# Patient Record
Sex: MALE | Race: WHITE | URBAN - METROPOLITAN AREA
[De-identification: names, ages, dates, MRNs, and addresses within clinical notes are randomized per-mention and may not be internally consistent; named-entity substitution may affect disease eponyms.]

---
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
## 2018-05-22 ENCOUNTER — OFFICE VISIT - HEALTHEAST (OUTPATIENT)
Dept: PHYSICAL MEDICINE AND REHAB | Facility: REHABILITATION | Age: 18
End: 2018-05-22

## 2018-05-22 DIAGNOSIS — M48.02 FORAMINAL STENOSIS OF CERVICAL REGION: ICD-10-CM

## 2018-05-22 DIAGNOSIS — M99.02 SOMATIC DYSFUNCTION OF THORACIC REGION: ICD-10-CM

## 2018-05-22 DIAGNOSIS — M99.07 SOMATIC DYSFUNCTION OF UPPER EXTREMITY: ICD-10-CM

## 2018-05-22 DIAGNOSIS — M99.01 SOMATIC DYSFUNCTION OF CERVICAL REGION: ICD-10-CM

## 2018-05-22 DIAGNOSIS — M99.00 SOMATIC DYSFUNCTION OF HEAD REGION: ICD-10-CM

## 2018-05-22 DIAGNOSIS — M54.2 CERVICALGIA: ICD-10-CM

## 2018-05-22 DIAGNOSIS — M99.08 SOMATIC DYSFUNCTION OF RIB REGION: ICD-10-CM

## 2018-05-22 DIAGNOSIS — M79.18 MYOFASCIAL PAIN: ICD-10-CM

## 2018-05-22 ASSESSMENT — MIFFLIN-ST. JEOR: SCORE: 1897.57

## 2018-05-30 ENCOUNTER — RECORDS - HEALTHEAST (OUTPATIENT)
Dept: ADMINISTRATIVE | Facility: OTHER | Age: 18
End: 2018-05-30

## 2019-05-23 NOTE — LETTER
4/26/2017      RE: Joshua Kenneth Goldberg  [id number] [address] [place] [id number]       CHIEF CONCERN: Concern for left shoulder posterior labral tear  DATE OF INJURY: 2/26/16    HISTORY OF PRESENT ILLNESS: Christopher comes back today with his father for a re-injury to the left shoulder. He was injured in a wrestling match at the beginning of March. At this point he says he feels that the shoulder isn't tight. He feels weak when doing bench press. He can still do lots of pushups.     PHYSICAL EXAM:  Young adult male in no distress. Accompanied by his father  LUE: skin intact, no pain to palpation, full active ROM. Negative apprehension test. Mildly positive Jerk test. Mildly positive Yakov's. No sense of instability. FE and ER strength are 5/5 and symmetric.     ASSESSMENT:  1. Poss left posterior labral tear on prior MRI    PLAN:  - Discussed that given current symptoms and reinjury will obtain MRI  - Will call with results. Discussed if MRI negative would recommend cuff and periscapular strengthening PT program. If significant labral tear noted will discuss next steps.    Umu Caraballo MD       None

## 2019-10-02 ENCOUNTER — HEALTH MAINTENANCE LETTER (OUTPATIENT)
Age: 19
End: 2019-10-02

## 2021-01-15 ENCOUNTER — HEALTH MAINTENANCE LETTER (OUTPATIENT)
Age: 21
End: 2021-01-15

## 2021-06-01 VITALS — BODY MASS INDEX: 24.65 KG/M2 | WEIGHT: 186 LBS | HEIGHT: 73 IN

## 2021-06-18 NOTE — PROGRESS NOTES
Assessment/Plan:      Diagnoses and all orders for this visit:    Cervicalgia  -     Ambulatory referral to Physical Therapy    Foraminal stenosis of cervical region  -     Ambulatory referral to Physical Therapy    Myofascial pain  -     Ambulatory referral to Physical Therapy    Somatic dysfunction of head region    Somatic dysfunction of cervical region    Somatic dysfunction of rib region    Somatic dysfunction of upper extremity    Somatic dysfunction of thoracic region        Assessment: Pleasant otherwise healthy 17-year-old gentleman with an [employer] with:    1.  3 month history of cervical spine pain left arm paresthesias to digits 4 and 5.  Arm paresthesias have resolved but still having decreased range of motion of the cervical spine and neck pain in the mid to lower cervical spine.  Symptoms are consistent with mechanical pain.     2.  Underlying degenerative disc disease which is minimal at C5-6 and 6-7 with left disc osteophyte complexes resulting in what I would consider mild to moderate foraminal stenosis in the left.  Unknown significance as his paresthesias were in a C8-T1 distribution.    3.  Somatic dysfunctions of the cranium, cervical spine, rib cage, upper extremities, thoracic spine, that contribute to the patient's pain complaints.     and I believe he potentially stretched his scalenes or brachial plexus during      Discussion:    1.  I discussed the diagnoses and treatment options.  He does have some minimal degenerative changes in the mid to lower cervical spine C5-6 and 6-7 with some foraminal stenosis that is mild to moderate in the left with disc osteophyte complexes.  Is difficult to know if the foraminal stenosis is responsible for his symptoms that he sustained during wrestling practice.  He may have over stretched his brachial plexus or scalene muscles resulting in the left arm paresthesias still having some associated cervical spine pain.  All in all his symptoms are  "improving.  We discussed options and the MRI.  We discussed options of physical therapy, manual medicine, medications, injections.  This point he wants to remain conservative.  2.  Start physical therapy for a few weeks of nerve glides cervical stretching and strengthening.    3.  Did provide some scalene stretches for him to do at home.  4.  Recommend OMT today.  He agrees to proceed.  Please see attached procedure note.  5.  He may continue to modify his activity until symptoms resolve and return to normal to very as he sees fit.  6.  Continue ibuprofen over-the-counter as needed.  7.  Follow-up with me as needed.      It was our pleasure caring for your patient today, if there any questions or concerns please do not hesitate to contact us.      Subjective:   Patient ID: Joshua K Goldberg is a 17 y.o. male.    History of Present Illness: Patient presents for evaluation of cervical spine pain previously having left arm paresthesias.  Is very pleasant 17-year-old gentleman who is a student at the [employer].  He is involved in wrestling.  Approximately 3 months ago, during practice with no specific injury he started to develop neck tightness and pinching in the mid to lower cervical spine with extension and having decreased range of motion particularly rotating to the left and began having numbness and tingling down the left arm to digits 4 and 5.  No weakness in the arm.  He was evaluated at the [employer] with an MRI and rest.  Given naproxen.  Over the past few months he has improved and he no longer is having paresthesias in his left arm.  Is 90% improved at this point with activity modifications has seen a chiropractor and done some minimal physical therapy exercises through the chiropractor.    The neck pain is in the cervical spine mid cervical spine and cervical thoracic junction worse with looking up he feels a \"pinch\".  He has decreased range of motion with rotation to the left.  No current radiation " down the arms or numbness tingling weakness.  No bowel or bladder incontinence.  No balance deficits.  He is here on break for little over a month.      Imaging: MRI report and images were personally reviewed and discussed with the patient.  A plastic model was utilized during the discussion.  MRI of the cervical spine personally reviewed.  Some minimal degenerative disc disease C5-6 and 6-7.  There is some mild right foraminal stenosis at C3-4.  At C5-6 and 6-7 there is a left disc osteophyte complex is relatively mild resulting in what I would consider mild to moderate foraminal stenosis on the left.  No central stenosis spinal cord compression.    Review of Systems: Patient denies bowel and bladder incontinence, paresthesias of the arms or legs, weakness in the arms or legs, and no sleep issues.  No rashes. Remainder of 12 point review systems negative unless listed above.    History reviewed. No pertinent past medical history.    The following portions of the patient's history were reviewed and updated as appropriate: allergies, current medications, past family history, past medical history, past social history, past surgical history and problem list.    Social history: [employer] student.    WHO 5: 21    OSMAR Score: 2  NDI Score: 6      Objective:   Physical Exam:    Vitals:    05/22/18 0825   BP: 116/62   Pulse: 65       General:  Well-appearing male in no acute distress.  Pleasant, cooperative, and interactive throughout the examination and interview.  CV: No lower extremity edema on inspection or paltation.  Lymphatics: No cervical lymphadenopathy palpated.  Eyes: sclera clear.  Skin: No rashes or lesions seen over the head/neck, hairline, arms, legs, trunk.  Respirations unlabored.  MSK: Gait is normal.  Able to heel-toe walk without difficulty.  Negative Romberg.  Spine: normal AP curves of the C, T, and L spine.  Decreased rotation cervical spine to the left minimal decreased cervical extension.  Full  flexion rotation to the right..  Palpation: Minimal tenderness over the cervical thoracic region in the paraspinals.  Extremities: Full range of motion of the shoulders, elbows, and wrists with no effusions or tenderness to palpation.  Negative arm drop, empty can, and Speed's test bilaterally.  Negative Presley and Neer's test bilaterally.  Full range of motion of the  knees, and ankles from a seated position with no effusions or tenderness to palpation. No hypermobility of the upper or lower extremities.  Neurologic exam: Mental status: Patient is alert and oriented with normal affect.  Attention, knowledge, memory, and language are intact.  Normal coordination throughout the examination.  Reflexes are 2+ and symmetric biceps, triceps, brachioradialis, patellar, and Achilles with Negative Johnathan's.  Sensation is intact to light touch throughout the upper and lower extremities bilaterally.  Manual muscle testing reveals 5 out of 5 strength in the shoulder abductors, elbow flexors/extensors, wrist extensors, interosseous, and finger flexors; lower extremity strength appears grossly normal.   Normal muscle bulk and tone in the arms and legs.  Negative Spurling's test bilaterally.  Hypertonic scalenes    Structural exam: Cranium: Left cranial torsion, OA sidebent left rotated right cervical spine: C2 rotated right side bent right C3 sidebent left rotated left flexed, C5 rotated left sidebent left flexed. Rib cage: Rib one elevated on the left. Thoracic spine: T1 rotated right sidebent right, upper thoracic myofascial restrictions.   Upper Extremities: myofascial restrictions of the bilat upper trap, infraspinatus/parascapular muscles.  Left scalene restriction    Procedure:    After discussing the risks and benefits of osteopathic manipulative medicine, verbal consent was obtained. The somatic dysfunctions listed above were treated with the following techniques: Cranium: Cranial indirect technique, VSD, and muscle  energy for the OA. Cervical spine: Muscle energy, still technique, FPR, myofascial release, BLT, and soft tissue techniques. Rib cage: Myofascial release and FPR. Thoracic spine: Myofascial release, BLT.   Upper Exrtremity: MFR, FPR, BLT.  The patient tolerated the procedure well and had improved range of motion in all areas treated prior to leaving the clinic.

## 2021-09-05 ENCOUNTER — HEALTH MAINTENANCE LETTER (OUTPATIENT)
Age: 21
End: 2021-09-05

## 2022-02-19 ENCOUNTER — HEALTH MAINTENANCE LETTER (OUTPATIENT)
Age: 22
End: 2022-02-19

## 2022-10-22 ENCOUNTER — HEALTH MAINTENANCE LETTER (OUTPATIENT)
Age: 22
End: 2022-10-22

## 2023-04-01 ENCOUNTER — HEALTH MAINTENANCE LETTER (OUTPATIENT)
Age: 23
End: 2023-04-01